# Patient Record
Sex: MALE | Race: OTHER | HISPANIC OR LATINO | URBAN - METROPOLITAN AREA
[De-identification: names, ages, dates, MRNs, and addresses within clinical notes are randomized per-mention and may not be internally consistent; named-entity substitution may affect disease eponyms.]

---

## 2024-04-24 ENCOUNTER — EMERGENCY (EMERGENCY)
Facility: HOSPITAL | Age: 59
LOS: 1 days | Discharge: HOME | End: 2024-04-24
Admitting: EMERGENCY MEDICINE
Payer: COMMERCIAL

## 2024-04-24 VITALS
WEIGHT: 139.99 LBS | OXYGEN SATURATION: 96 % | HEART RATE: 84 BPM | DIASTOLIC BLOOD PRESSURE: 77 MMHG | TEMPERATURE: 99 F | SYSTOLIC BLOOD PRESSURE: 111 MMHG | RESPIRATION RATE: 18 BRPM

## 2024-04-24 LAB — HIV 1+2 AB+HIV1 P24 AG SERPL QL IA: SIGNIFICANT CHANGE UP

## 2024-04-24 PROCEDURE — 99284 EMERGENCY DEPT VISIT MOD MDM: CPT

## 2024-04-24 PROCEDURE — 36415 COLL VENOUS BLD VENIPUNCTURE: CPT

## 2024-04-24 PROCEDURE — 87389 HIV-1 AG W/HIV-1&-2 AB AG IA: CPT

## 2024-04-24 PROCEDURE — 99283 EMERGENCY DEPT VISIT LOW MDM: CPT

## 2024-04-24 RX ORDER — CEPHALEXIN 500 MG
1 CAPSULE ORAL
Qty: 28 | Refills: 0
Start: 2024-04-24 | End: 2024-04-30

## 2024-04-24 NOTE — ED PROVIDER NOTE - SKIN, MLM
+ multiple scabs to L buttock, few indurated areas surrounding hair follicles, no pus, no bleeding, no fluctuance, no erythema or swelling

## 2024-04-24 NOTE — ED ADULT NURSE NOTE - OBJECTIVE STATEMENT
58yo presents with multiple abscesses with pustules on his back. states they've been on and off for months. denies fever/chills.

## 2024-04-24 NOTE — ED PROVIDER NOTE - OBJECTIVE STATEMENT
The pt is a 60 y/o M, who presents to ED c/o "sores" to buttock x mon. Has not seen pmd or taken any meds for symptoms. States that area is itchy, has been scratching it, noticing new sores appear. Denies fevers, chills, trauma, pus, bleeding.

## 2024-04-24 NOTE — ED PROVIDER NOTE - NSFOLLOWUPCLINICS_GEN_ALL_ED_FT
[facility]  Family Medicine  [address]  [place], [place] [address]  Phone: [phone]  Fax:

## 2024-04-24 NOTE — ED PROVIDER NOTE - NSFOLLOWUPINSTRUCTIONS_ED_ALL_ED_FT
Folliculitis  A normal hair follicle compared to one that is infected. The infected follicle is swollen and contains pus.  Folliculitis occurs when hair follicles become inflamed. A hair follicle is a tiny opening in your skin where your hair grows from. This condition often occurs on the scalp, thighs, legs, back, and buttocks but can happen anywhere on the body.  What are the causes?  A common cause of this condition is an infection from bacteria. The type of folliculitis caused by bacteria can last a long time or go away and come back. The bacteria can live anywhere on your skin. They are often found in the nostrils.  Other causes may include:  An infection from a fungus.  An infection from a virus.  Your skin touching some chemicals, such as oils and tars.  Shaving or waxing.  Greasy ointments or creams put on the skin.  What increases the risk?  You are more likely to develop this condition if:  Your body has a weak disease-fighting system (immune system).  You have diabetes.  You are obese.  What are the signs or symptoms?  Symptoms of this condition include:  Redness.  Soreness.  Swelling.  Itching.  Small white or yellow, itchy spots filled with pus (pustules) that appear over a red area. If the infection goes deep into the follicle, these may turn into a boil (furuncle).  A group of boils (carbuncle). These tend to form in hairy, sweaty areas of the body.  How is this diagnosed?  This condition is diagnosed with a skin exam. Your health care provider may take a sample of one of the pustules or boils to test in a lab.  How is this treated?  This condition may be treated by:  Putting a warm, wet cloth (warm compress) on the affected areas.  Taking antibiotics or applying them to the skin.  Applying or bathing with a solution that kills germs (antiseptic).  Taking an over-the-counter medicine. This can help with itching.  Having a procedure to drain pustules or boils. This may be done if a pustule or boil contains a lot of pus or fluid.  Having laser hair removal. This may be done when the condition lasts for a long time.  Follow these instructions at home:  Managing pain and swelling  A heating pad for use on the affected area.  If directed, apply heat to the affected area as often as told by your health care provider. Use the heat source that your health care provider recommends, such as a moist heat pack or a heating pad.  Place a towel between your skin and the heat source.  Leave the heat on for 20–30 minutes.  If your skin turns bright red, remove the heat right away to prevent burns. The risk of burns is higher if you cannot feel pain, heat, or cold.  General instructions  Take over-the-counter and prescription medicines only as told by your health care provider.  If you were prescribed antibiotics, take or apply them as told by your health care provider. Do not stop using the antibiotic even if you start to feel better.  Check your irritated area every day for signs of infection. Check for:  More redness, swelling, or pain.  Fluid or blood.  Warmth.  Pus or a bad smell.  Do not shave irritated skin.  Keep all follow-up visits. Your health care provider will check if the treatments are helping.  Contact a health care provider if:  You have a fever.  You have any signs of infection.  Red streaks are spreading from the affected area

## 2024-04-24 NOTE — ED ADULT TRIAGE NOTE - CHIEF COMPLAINT QUOTE
pt presents to ER c/o multiple abscesses on buttock for the past month. denies any fevers or drainage

## 2024-04-24 NOTE — ED PROVIDER NOTE - PATIENT PORTAL LINK FT
You can access the FollowMyHealth Patient Portal offered by [facility] by registering at the following website: [email or web address] By joining [employer]’s FollowMyHealth portal, you will also be able to view your health information using other applications (apps) compatible with our system.

## 2024-04-24 NOTE — ED PROVIDER NOTE - CLINICAL SUMMARY MEDICAL DECISION MAKING FREE TEXT BOX
pt c/o "sores" to buttock x month, no acute changes in symptoms today, on exam few indurated areas surrounding hair follicles, few scabs, no abscess to i&d, will tx w/abx, to f/u w/pmd or derm, pt understands and agrees w/plan, strict return precautions given

## 2024-04-26 DIAGNOSIS — L02.31 CUTANEOUS ABSCESS OF BUTTOCK: ICD-10-CM

## 2024-04-26 DIAGNOSIS — L73.9 FOLLICULAR DISORDER, UNSPECIFIED: ICD-10-CM
